# Patient Record
Sex: MALE | Race: BLACK OR AFRICAN AMERICAN | URBAN - METROPOLITAN AREA
[De-identification: names, ages, dates, MRNs, and addresses within clinical notes are randomized per-mention and may not be internally consistent; named-entity substitution may affect disease eponyms.]

---

## 2017-02-14 ENCOUNTER — OFFICE VISIT (OUTPATIENT)
Dept: ORTHOPEDIC SURGERY | Age: 32
End: 2017-02-14

## 2017-02-14 VITALS
HEART RATE: 65 BPM | BODY MASS INDEX: 25.33 KG/M2 | SYSTOLIC BLOOD PRESSURE: 120 MMHG | HEIGHT: 67 IN | RESPIRATION RATE: 16 BRPM | WEIGHT: 161.4 LBS | DIASTOLIC BLOOD PRESSURE: 78 MMHG

## 2017-02-14 DIAGNOSIS — M54.2 NECK PAIN: Primary | ICD-10-CM

## 2017-02-14 DIAGNOSIS — M54.2 CERVICAL PAIN (NECK): ICD-10-CM

## 2017-02-14 RX ORDER — NAPROXEN 500 MG/1
500 TABLET ORAL 2 TIMES DAILY WITH MEALS
Qty: 30 TAB | Refills: 0 | Status: SHIPPED | OUTPATIENT
Start: 2017-02-14

## 2017-02-14 RX ORDER — METHYLPREDNISOLONE 4 MG/1
TABLET ORAL
Qty: 1 DOSE PACK | Refills: 0 | Status: SHIPPED | OUTPATIENT
Start: 2017-02-14 | End: 2017-07-31 | Stop reason: ALTCHOICE

## 2017-02-14 NOTE — MR AVS SNAPSHOT
Visit Information Date & Time Provider Department Dept. Phone Encounter #  
 2/14/2017 11:20 AM Erma Pedro MD 4 [facility], [address] [facility] [place] [phone] [id number] Follow-up Instructions Return in about 4 weeks (around 3/14/2017). Upcoming Health Maintenance Date Due Pneumococcal 19-64 Medium Risk (1 of 1 - PPSV23) 8/28/2004 DTaP/Tdap/Td series (1 - Tdap) 8/28/2006 INFLUENZA AGE 9 TO ADULT 8/1/2016 Allergies as of 2/14/2017  Review Complete On: 2/14/2017 By: Erma Pedro MD  
 No Known Allergies Current Immunizations  Never Reviewed No immunizations on file. Not reviewed this visit You Were Diagnosed With   
  
 Codes Comments Neck pain    -  Primary ICD-10-CM: M54.2 ICD-9-CM: 723.1 Cervical pain (neck)     ICD-10-CM: M54.2 ICD-9-CM: 723.1 Vitals BP Pulse Resp Height(growth percentile) Weight(growth percentile) BMI  
 120/78 (BP 1 Location: Right arm, BP Patient Position: Sitting) 65 16 5' 7\" (1.702 m) 161 lb 6.4 oz (73.2 kg) 25.28 kg/m2 Smoking Status Current Every Day Smoker BMI and BSA Data Body Mass Index Body Surface Area  
 25.28 kg/m 2 1.86 m 2 Preferred Pharmacy Pharmacy Name Phone  
 [address] [place] [address] [phone] Your Updated Medication List  
  
   
This list is accurate as of: 2/14/17 12:42 PM.  Always use your most recent med list.  
  
  
  
  
 cyclobenzaprine 10 mg tablet Commonly known as:  FLEXERIL Take  by mouth three (3) times daily as needed for Muscle Spasm(s). diclofenac EC 75 mg EC tablet Commonly known as:  VOLTAREN Take 1 Tab by mouth two (2) times a day.  
  
 gabapentin 300 mg capsule Commonly known as:  NEURONTIN Take 1 Cap by mouth three (3) times daily (with meals). methylPREDNISolone 4 mg tablet Commonly known as:  Elhung Guernsey  
 Per dose pack instructions  
  
 naproxen 500 mg tablet Commonly known as:  NAPROSYN Take 1 Tab by mouth two (2) times daily (with meals). Prescriptions Sent to Pharmacy Refills  
 methylPREDNISolone (MEDROL DOSEPACK) 4 mg tablet 0 Sig: Per dose pack instructions Class: Normal  
 Pharmacy: [address] [place] [address] Ph #: [phone]  
 naproxen (NAPROSYN) 500 mg tablet 0 Sig: Take 1 Tab by mouth two (2) times daily (with meals). Class: Normal  
 Pharmacy: [address] [place] [address] Ph #: [phone] Route: Oral  
  
We Performed the Following AMB POC XRAY, SPINE, CERVICAL; 2 OR 3 [94486 CPT(R)] REFERRAL TO PHYSICAL THERAPY [WKD54 Custom] Comments:  
 DX:neck to right shoulder [address] [facility] [address] 
2-3 visits/ 2-3 weeks Follow-up Instructions Return in about 4 weeks (around 3/14/2017). Referral Information Referral ID Referred By Referred To  
  
 [id number] KASHMIR HENSON III Not Available Visits Status Start Date End Date 9 New Request 2/14/17 2/14/18 If your referral has a status of pending review or denied, additional information will be sent to support the outcome of this decision. Introducing [facility] & [facility]! Torito Holder introduces [employer] patient portal. Now you can access parts of your medical record, email your doctor's office, and request medication refills online. 1. In your internet browser, go to [email or web address] pace [email or web address] Technologyt 2. Click on the First Time User? Click Here link in the Sign In box. You will see the New Member Sign Up page. 3. Enter your [employer] Access Code exactly as it appears below. You will not need to use this code after youve completed the sign-up process. If you do not sign up before the expiration date, you must request a new code. · [employer] Access Code: [email or web address] Expires: 5/15/2017 10:32 AM 
 
4. Enter the last four digits of your Social Security Number (xxxx) and Date of Birth (mm/dd/yyyy) as indicated and click Submit. You will be taken to the next sign-up page. 5. Create a GreenLight ID. This will be your GreenLight login ID and cannot be changed, so think of one that is secure and easy to remember. 6. Create a GreenLight password. You can change your password at any time. 7. Enter your Password Reset Question and Answer. This can be used at a later time if you forget your password. 8. Enter your e-mail address. You will receive e-mail notification when new information is available in [address] 9. Click Sign Up. You can now view and download portions of your medical record. 10. Click the Download Summary menu link to download a portable copy of your medical information. If you have questions, please visit the Frequently Asked Questions section of the GreenLight website. Remember, GreenLight is NOT to be used for urgent needs. For medical emergencies, dial 911. Now available from your iPhone and Android! Please provide this summary of care documentation to your next provider. If you have any questions after today's visit, please call [phone].

## 2017-02-14 NOTE — LETTER
NOTIFICATION OF RETURN TO WORK / SCHOOL 
 
2/14/2017 12:42 PM 
 
Mr. Fay Yanes 
[address] [address] Faina Yang To Whom It May Concern: 
 
Tena Mendez III was under the care of [address] today 2-14-7. Mr. Smith Fearing is cleared to return to work form a spinal standpoint. If you have any questions please call the office at [phone]. Sincerely, Vianca Domínguez MD

## 2017-07-31 ENCOUNTER — OFFICE VISIT (OUTPATIENT)
Dept: ORTHOPEDIC SURGERY | Age: 32
End: 2017-07-31

## 2017-07-31 VITALS
SYSTOLIC BLOOD PRESSURE: 117 MMHG | BODY MASS INDEX: 25.22 KG/M2 | DIASTOLIC BLOOD PRESSURE: 72 MMHG | WEIGHT: 161 LBS | HEART RATE: 66 BPM | RESPIRATION RATE: 18 BRPM | TEMPERATURE: 98.5 F | OXYGEN SATURATION: 99 %

## 2017-07-31 DIAGNOSIS — M54.5 LOW BACK PAIN, UNSPECIFIED BACK PAIN LATERALITY, UNSPECIFIED CHRONICITY, WITH SCIATICA PRESENCE UNSPECIFIED: Primary | ICD-10-CM

## 2017-07-31 DIAGNOSIS — M54.2 CERVICAL PAIN (NECK): ICD-10-CM

## 2017-07-31 NOTE — MR AVS SNAPSHOT
Visit Information Date & Time Provider Department Dept. Phone Encounter #  
 7/31/2017 11:30 AM Isela Jo NP [facility] [phone] [id number] Your Appointments 7/31/2017 11:30 AM  
Follow Up with Isela Jo NP  
[facility] ([facility]) Appt Note: BACK F/U  
 [address] [facility] [address]  
[phone]  
  
   
 [address] [facility] [address] Upcoming Health Maintenance Date Due Pneumococcal 19-64 Medium Risk (1 of 1 - PPSV23) 8/28/2004 DTaP/Tdap/Td series (1 - Tdap) 8/28/2006 INFLUENZA AGE 9 TO ADULT 8/1/2017 Allergies as of 7/31/2017  Review Complete On: 7/31/2017 By: Isela Jo NP No Known Allergies Current Immunizations  Never Reviewed No immunizations on file. Not reviewed this visit Vitals BP Pulse Temp Resp Weight(growth percentile) SpO2  
 117/72 66 98.5 °F (36.9 °C) 18 161 lb (73 kg) 99% BMI Smoking Status 25.22 kg/m2 Current Every Day Smoker BMI and BSA Data Body Mass Index Body Surface Area  
 25.22 kg/m 2 1.86 m 2 Preferred Pharmacy Pharmacy Name Phone  
 [address] [place] [address] [phone] Your Updated Medication List  
  
   
This list is accurate as of: 7/31/17 11:19 AM.  Always use your most recent med list.  
  
  
  
  
 cyclobenzaprine 10 mg tablet Commonly known as:  FLEXERIL Take  by mouth three (3) times daily as needed for Muscle Spasm(s). diclofenac EC 75 mg EC tablet Commonly known as:  VOLTAREN Take 1 Tab by mouth two (2) times a day.  
  
 gabapentin 300 mg capsule Commonly known as:  NEURONTIN Take 1 Cap by mouth three (3) times daily (with meals). naproxen 500 mg tablet Commonly known as:  NAPROSYN Take 1 Tab by mouth two (2) times daily (with meals). Patient Instructions MyChart Activation Thank you for requesting access to [employer]. Please follow the instructions below to securely access and download your online medical record. [employer] allows you to send messages to your doctor, view your test results, renew your prescriptions, schedule appointments, and more. How Do I Sign Up? 1. In your internet browser, go to [email or web address].[email or web address] Financial 
2. Click on the First Time User? Click Here link in the Sign In box. You will be redirect to the New Member Sign Up page. 3. Enter your Zab[employer] Studio Access Code exactly as it appears below. You will not need to use this code after youve completed the sign-up process. If you do not sign up before the expiration date, you must request a new code. [employer] Access Code: [email or web address] Expires: 10/29/2017 10:28 AM (This is the date your [employer] access code will ) 4. Enter the last four digits of your Social Security Number (xxxx) and Date of Birth (mm/dd/yyyy) as indicated and click Submit. You will be taken to the next sign-up page. 5. Create a Zabu Studio ID. This will be your Z[employer] Studio login ID and cannot be changed, so think of one that is secure and easy to remember. 6. Create a Z[employer] Studio password. You can change your password at any time. 7. Enter your Password Reset Question and Answer. This can be used at a later time if you forget your password. 8. Enter your e-mail address. You will receive e-mail notification when new information is available in [address] 9. Click Sign Up. You can now view and download portions of your medical record. 10. Click the Download Summary menu link to download a portable copy of your medical information. Additional Information If you have questions, please visit the Frequently Asked Questions section of the Zabu Studio website at [email or web address]". [employer]. [email or web address] Remember, Zabu Studio is NOT to be used for urgent needs. For medical emergencies, dial 911. Introducing [facility] & HEALTH SERVICES! Fausto Bae introduces [employer]Apps patient portal. Now you can access parts of your medical record, email your doctor's office, and request medication refills online. 1. In your internet browser, go to [email or web address][email or web address]/Alibabat 2. Click on the First Time User? Click Here link in the Sign In box. You will see the New Member Sign Up page. 3. Enter your KickApps Access Code exactly as it appears below. You will not need to use this code after youve completed the sign-up process. If you do not sign up before the expiration date, you must request a new code. · KickApps Access Code: [email or web address] Expires: 10/29/2017 10:28 AM 
 
4. Enter the last four digits of your Social Security Number (xxxx) and Date of Birth (mm/dd/yyyy) as indicated and click Submit. You will be taken to the next sign-up page. 5. Create a KickApps ID. This will be your KickApps login ID and cannot be changed, so think of one that is secure and easy to remember. 6. Create a KickApps password. You can change your password at any time. 7. Enter your Password Reset Question and Answer. This can be used at a later time if you forget your password. 8. Enter your e-mail address. You will receive e-mail notification when new information is available in [address] 9. Click Sign Up. You can now view and download portions of your medical record. 10. Click the Download Summary menu link to download a portable copy of your medical information. If you have questions, please visit the Frequently Asked Questions section of the KickApps website. Remember, KickApps is NOT to be used for urgent needs. For medical emergencies, dial 911. Now available from your iPhone and Android! Please provide this summary of care documentation to your next provider. If you have any questions after today's visit, please call [phone].

## 2017-07-31 NOTE — PATIENT INSTRUCTIONS
[employer] Activation    Thank you for requesting access to [employer]. Please follow the instructions below to securely access and download your online medical record. [employer] allows you to send messages to your doctor, view your test results, renew your prescriptions, schedule appointments, and more. How Do I Sign Up? 1. In your internet browser, go to [email or web address] Books  2. Click on the First Time User? Click Here link in the Sign In box. You will be redirect to the New Member Sign Up page. 3. Enter your [employer] Access Code exactly as it appears below. You will not need to use this code after youve completed the sign-up process. If you do not sign up before the expiration date, you must request a new code. [employer] Access Code: [email or web address]  Expires: 10/29/2017 10:28 AM (This is the date your [employer] access code will )    4. Enter the last four digits of your Social Security Number (xxxx) and Date of Birth (mm/dd/yyyy) as indicated and click Submit. You will be taken to the next sign-up page. 5. Create a [employer] ID. This will be your [employer] login ID and cannot be changed, so think of one that is secure and easy to remember. 6. Create a [employer] password. You can change your password at any time. 7. Enter your Password Reset Question and Answer. This can be used at a later time if you forget your password. 8. Enter your e-mail address. You will receive e-mail notification when new information is available in [address] 9. Click Sign Up. You can now view and download portions of your medical record. 10. Click the Download Summary menu link to download a portable copy of your medical information. Additional Information    If you have questions, please visit the Frequently Asked Questions section of the [employer] website at [email or web address] Remember, [employer] is NOT to be used for urgent needs. For medical emergencies, dial 911.

## 2017-07-31 NOTE — PROGRESS NOTES
Chief complaint/History of Present Illness:  Chief Complaint   Patient presents with    Back Pain     f/u     EVELYN Leroy is a  32 y.o.  male      HISTORY OF PRESENT ILLNESS:  The patient comes in today stating his back and neck pain are much better. He is only stiff in the mornings. He uses some kind of cream the chiropractor gave him and does stretching and then he is fine. He has no arm or leg pain. He last saw Dr. Eliazar Duncan on February 14, 2017, at which time he was returned back to work full duty. 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 He smokes one-half pack cigarettes per day. He denies fever or bowel or bladder dysfunction. PHYSICAL EXAM:  Mr. Rose Henson is a 35-year-old male. He is alert and oriented. He has a full weight bearing, non-antalgic gait. He has normal tandem gait. He has 5/5 strength of the bilateral upper and lower extremities, negative Quinonezs and negative straight leg raise. He has no pain with hyperextension of the lumbar spine. ASSESSENT/PLAN:  This is a patient who has had some back pain and neck pain in the past.  He is managing it very well with stretching exercises and some cream the chiropractor gave him. We returned him back to work on February 14, 2017. He needs this form at least to work. So, we put that he was out of work from January 18, 2017, through February 14, 2017, which is the date Dr. Eliazar Duncan returned him back to work full duty. He also needs some records. He has filled out a release of information. We will turn it in at the . We will see him back as needed.         Review of systems:    Past Medical History:   Diagnosis Date    Asthma     has not had any issues since age 12     Past Surgical History:   Procedure Laterality Date    HX OTHER SURGICAL      appendectomy     Social History     Social History    Marital status: SINGLE     Spouse name: N/A    Number of children: N/A    Years of education: N/A     Occupational History    Not on file. Social History Main Topics    Smoking status: Current Every Day Smoker     Packs/day: 0.50    Smokeless tobacco: Never Used    Alcohol use 1.2 oz/week     2 Cans of beer per week      Comment: daily    Drug use: No    Sexual activity: Not on file     Other Topics Concern    Not on file     Social History Narrative     Family History   Problem Relation Age of Onset    Diabetes Maternal Grandmother     Cancer Maternal Grandfather     Cancer Paternal Grandmother        Physical Exam:  Visit Vitals    /72    Pulse 66    Temp 98.5 °F (36.9 °C)    Resp 18    Wt 161 lb (73 kg)    SpO2 99%    BMI 25.22 kg/m2     Pain Scale: 2/10     has been . reviewed and is appropriate      Diagnoses and all orders for this visit:    1. Low back pain, unspecified back pain laterality, unspecified chronicity, with sciatica presence unspecified    2. Cervical pain (neck)            Follow-up Disposition:  Return if symptoms worsen or fail to improve.         We have informed Zulma Samson III to notify us for immediate appointment if he has any worsening neurogical symptoms or if an emergency situation presents, then call 911

## 2017-08-01 ENCOUNTER — TELEPHONE (OUTPATIENT)
Dept: ORTHOPEDIC SURGERY | Age: 32
End: 2017-08-01

## 2017-08-01 NOTE — TELEPHONE ENCOUNTER
Pt called in states that his job needs a complete summary of office notes from yesterday's 07/31/17 appt. Pt wanted to see if they could be faxed to [phone]. .   Pt can be reached at [phone].